# Patient Record
Sex: FEMALE
[De-identification: names, ages, dates, MRNs, and addresses within clinical notes are randomized per-mention and may not be internally consistent; named-entity substitution may affect disease eponyms.]

---

## 2023-01-14 ENCOUNTER — HOSPITAL ENCOUNTER (EMERGENCY)
Dept: HOSPITAL 76 - ED | Age: 68
Discharge: HOME | End: 2023-01-14
Payer: COMMERCIAL

## 2023-01-14 ENCOUNTER — HOSPITAL ENCOUNTER (OUTPATIENT)
Dept: HOSPITAL 76 - EMS | Age: 68
End: 2023-01-14
Payer: COMMERCIAL

## 2023-01-14 VITALS — DIASTOLIC BLOOD PRESSURE: 73 MMHG | SYSTOLIC BLOOD PRESSURE: 154 MMHG

## 2023-01-14 DIAGNOSIS — Z02.89: Primary | ICD-10-CM

## 2023-01-14 DIAGNOSIS — Z76.89: Primary | ICD-10-CM

## 2023-01-14 PROCEDURE — 99281 EMR DPT VST MAYX REQ PHY/QHP: CPT

## 2023-01-14 PROCEDURE — 99282 EMERGENCY DEPT VISIT SF MDM: CPT
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
